# Patient Record
Sex: MALE | Race: WHITE | Employment: FULL TIME | URBAN - METROPOLITAN AREA
[De-identification: names, ages, dates, MRNs, and addresses within clinical notes are randomized per-mention and may not be internally consistent; named-entity substitution may affect disease eponyms.]

---

## 2020-06-12 ENCOUNTER — HOSPITAL ENCOUNTER (EMERGENCY)
Age: 50
Discharge: HOME | End: 2020-06-12
Payer: COMMERCIAL

## 2020-06-12 VITALS
DIASTOLIC BLOOD PRESSURE: 75 MMHG | HEART RATE: 87 BPM | WEIGHT: 302 LBS | SYSTOLIC BLOOD PRESSURE: 114 MMHG | TEMPERATURE: 98.4 F | RESPIRATION RATE: 18 BRPM | OXYGEN SATURATION: 95 % | HEIGHT: 73 IN | BODY MASS INDEX: 40.02 KG/M2

## 2020-06-12 PROCEDURE — 12001 RPR S/N/AX/GEN/TRNK 2.5CM/<: CPT

## 2020-06-12 PROCEDURE — 99282 EMERGENCY DEPT VISIT SF MDM: CPT

## 2020-06-12 PROCEDURE — 90715 TDAP VACCINE 7 YRS/> IM: CPT | Performed by: PHYSICIAN ASSISTANT

## 2020-06-12 PROCEDURE — 90471 IMMUNIZATION ADMIN: CPT | Performed by: PHYSICIAN ASSISTANT

## 2020-06-12 PROCEDURE — 6360000002 HC RX W HCPCS: Performed by: PHYSICIAN ASSISTANT

## 2020-06-12 RX ADMIN — TETANUS TOXOID, REDUCED DIPHTHERIA TOXOID AND ACELLULAR PERTUSSIS VACCINE, ADSORBED 0.5 ML: 5; 2.5; 8; 8; 2.5 SUSPENSION INTRAMUSCULAR at 17:46

## 2020-06-12 NOTE — ED PROVIDER NOTES
[facility]     Department of Emergency Medicine   ED  Provider Note  Admit Date/RoomTime: 6/12/2020  5:28 PM  ED Room: ED-SCR/SCR  Chief Complaint   Laceration (LT INDEX FINGER ON HATCHETT)    History of Present Illness   Source of history provided by:  patient. History/Exam Limitations: none. Olivia Barahona is a 52 y.o. old male presenting to the emergency department by private vehicle, for a laceration to the left index finger, caused by hatchet, which occurred at home approximately a few minute(s) prior to arrival.  There is not a possibility of retained foreign body in the affected area. The patients tetanus status is more than 5 years ago. Bleeding is  controlled. There is no pain at injury site. ROS    Pertinent positives and negatives are stated within HPI, all other systems reviewed and are negative. Past Medical History:  has no past medical history on file. Past Surgical History:  has no past surgical history on file. Social History:  reports that he has never smoked. He does not have any smokeless tobacco history on file. He reports that he does not drink alcohol. Family History: family history is not on file. Allergies: Sulfa antibiotics    Physical Exam            ED Triage Vitals   BP Temp Temp Source Pulse Resp SpO2 Height Weight   -- 06/12/20 1721 06/12/20 1721 06/12/20 1724 06/12/20 1724 06/12/20 1724 06/12/20 1724 06/12/20 1724    98.4 °F (36.9 °C) Oral 87 18 95 % 6' 1\" (1.854 m) (!) 302 lb (137 kg)      Oxygen Saturation Interpretation: Normal.    Constitutional:  Alert, development consistent with age. HEENT:  NC/NT. Airway patent. Neck:  Normal ROM. Supple. Non-tender. Digits:   Left Index finger distal phalanx lateral aspect. Tenderness:  none. .              Swelling: None. Deformity: no.             ROM: full range of motion. Patient has full active range of motion of the left second MCP joint, PIP and DIP joint.   5 out of 5